# Patient Record
(demographics unavailable — no encounter records)

---

## 2025-02-04 NOTE — DEVELOPMENTAL MILESTONES
[Normal Development] : Normal Development [None] : none [FreeTextEntry1] : Locomotor:  Runs well.  Up and down stairs, one step at a time.  Opens doors.  Climbs on furniture.  Jumps, both feet off floor, in place.Locomotor:  Runs well.  Up and down stairs, one step at a time.  Opens doors.  Climbs on furniture.  Jumps, both feet off floor, in place.Large Object:  [place] of 7 cubes,  "train" of 4 cubes.Small Object:  Threads shoelace through hole in safety pin.Crayon/Paper:  Imitates vertical stroke.  Imitates circular stroke with circular scribble.   Folds paper imitatively.  Parallel play.  Handles spoon well.  Helps to undress.  Listens to stories with pictures. [Passed] : passed

## 2025-02-04 NOTE — HISTORY OF PRESENT ILLNESS
[Cow's milk (Ounces per day ___)] : consumes [unfilled] oz of Cow's milk per day [Fruit] : fruit [Vegetables] : vegetables [Meat] : meat [Eggs] : eggs [Finger Foods] : finger foods [Normal] : Normal [Pacifier use] : Pacifier use [Yes] : Patient goes to dentist yearly [Toothpaste] : Primary Fluoride Source: Toothpaste [In nursery school] : In nursery school [Playtime 60 min a day] : Playtime 60 min a day [Temper Tantrums] : Temper Tantrums [<2 hrs of screen time] : Less than 2 hrs of screen time [No] : Not at  exposure [Water heater temperature set at <120 degrees F] : Water heater temperature set at <120 degrees F [Car seat in back seat] : Car seat in back seat [Smoke Detectors] : Smoke detectors [Carbon Monoxide Detectors] : Carbon monoxide detectors [At risk for exposure to TB] : Not at risk for exposure to Tuberculosis [Up to date] : Up to date [LastFluorideTreatment] : 2024 [NO] : No [FreeTextEntry1] : 2 YR OLD WELL VISIT

## 2025-02-04 NOTE — PHYSICAL EXAM
[Alert] : alert [No Acute Distress] : no acute distress [Normocephalic] : normocephalic [Anterior [place] Closed] : anterior fontanelle closed [Red Reflex Bilateral] : red reflex bilateral [PERRL] : PERRL [Normally Placed Ears] : normally placed ears [Auricles Well Formed] : auricles well formed [Clear Tympanic membranes with present light reflex and bony landmarks] : clear tympanic membranes with present light reflex and bony landmarks [No Discharge] : no discharge [Nares Patent] : nares patent [Palate Intact] : palate intact [Uvula Midline] : uvula midline [Tooth Eruption] : tooth eruption  [Supple, full passive range of motion] : supple, full passive range of motion [No Palpable Masses] : no palpable masses [Symmetric Chest Rise] : symmetric chest rise [Clear to Auscultation Bilaterally] : clear to auscultation bilaterally [Regular Rate and Rhythm] : regular rate and rhythm [S1, S2 present] : S1, S2 present [No Murmurs] : no murmurs [+2 Femoral Pulses] : +2 femoral pulses [Soft] : soft [NonTender] : non tender [Non Distended] : non distended [Normoactive Bowel Sounds] : normoactive bowel sounds [No Hepatomegaly] : no hepatomegaly [No Splenomegaly] : no splenomegaly [Central Urethral Opening] : central urethral opening [Testicles Descended Bilaterally] : testicles descended bilaterally [Patent] : patent [Normally Placed] : normally placed [No Abnormal Lymph Nodes Palpated] : no abnormal lymph nodes palpated [No Clavicular Crepitus] : no clavicular crepitus [Symmetric Buttocks Creases] : symmetric buttocks creases [No Spinal Dimple] : no spinal dimple [NoTuft of Hair] : no tuft of hair [Cranial Nerves Grossly Intact] : cranial nerves grossly intact [No Rash or Lesions] : no rash or lesions